# Patient Record
Sex: FEMALE | Race: WHITE | HISPANIC OR LATINO | Employment: UNEMPLOYED | URBAN - METROPOLITAN AREA
[De-identification: names, ages, dates, MRNs, and addresses within clinical notes are randomized per-mention and may not be internally consistent; named-entity substitution may affect disease eponyms.]

---
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
## 2024-01-04 ENCOUNTER — HOSPITAL ENCOUNTER (EMERGENCY)
Facility: HOSPITAL | Age: 19
Discharge: HOME | End: 2024-01-04
Attending: EMERGENCY MEDICINE
Payer: COMMERCIAL

## 2024-01-04 ENCOUNTER — APPOINTMENT (EMERGENCY)
Dept: RADIOLOGY | Facility: HOSPITAL | Age: 19
End: 2024-01-04
Payer: COMMERCIAL

## 2024-01-04 VITALS
OXYGEN SATURATION: 97 % | SYSTOLIC BLOOD PRESSURE: 119 MMHG | TEMPERATURE: 98.1 F | HEART RATE: 76 BPM | DIASTOLIC BLOOD PRESSURE: 65 MMHG | RESPIRATION RATE: 18 BRPM

## 2024-01-04 DIAGNOSIS — R07.9 CHEST PAIN: Primary | ICD-10-CM

## 2024-01-04 LAB
ALBUMIN SERPL BCP-MCNC: 4 G/DL (ref 3.5–5)
ALP SERPL-CCNC: 76 U/L (ref 34–104)
ALT SERPL W P-5'-P-CCNC: 39 U/L (ref 7–52)
ANION GAP SERPL CALCULATED.3IONS-SCNC: 5 MMOL/L
AST SERPL W P-5'-P-CCNC: 36 U/L (ref 13–39)
BASOPHILS # BLD AUTO: 0.01 THOUSANDS/ÂΜL (ref 0–0.1)
BASOPHILS NFR BLD AUTO: 0 % (ref 0–1)
BILIRUB SERPL-MCNC: 0.41 MG/DL (ref 0.2–1)
BUN SERPL-MCNC: 13 MG/DL (ref 5–25)
CALCIUM SERPL-MCNC: 9.3 MG/DL (ref 8.4–10.2)
CARDIAC TROPONIN I PNL SERPL HS: 3 NG/L
CHLORIDE SERPL-SCNC: 108 MMOL/L (ref 96–108)
CO2 SERPL-SCNC: 24 MMOL/L (ref 21–32)
CREAT SERPL-MCNC: 0.86 MG/DL (ref 0.6–1.3)
EOSINOPHIL # BLD AUTO: 0.31 THOUSAND/ÂΜL (ref 0–0.61)
EOSINOPHIL NFR BLD AUTO: 4 % (ref 0–6)
ERYTHROCYTE [DISTWIDTH] IN BLOOD BY AUTOMATED COUNT: 13.2 % (ref 11.6–15.1)
GFR SERPL CREATININE-BSD FRML MDRD: 98 ML/MIN/1.73SQ M
GLUCOSE SERPL-MCNC: 85 MG/DL (ref 65–140)
HCT VFR BLD AUTO: 41.6 % (ref 34.8–46.1)
HGB BLD-MCNC: 13.8 G/DL (ref 11.5–15.4)
IMM GRANULOCYTES # BLD AUTO: 0.02 THOUSAND/UL (ref 0–0.2)
IMM GRANULOCYTES NFR BLD AUTO: 0 % (ref 0–2)
LYMPHOCYTES # BLD AUTO: 2.05 THOUSANDS/ÂΜL (ref 0.6–4.47)
LYMPHOCYTES NFR BLD AUTO: 29 % (ref 14–44)
MCH RBC QN AUTO: 28.6 PG (ref 26.8–34.3)
MCHC RBC AUTO-ENTMCNC: 33.2 G/DL (ref 31.4–37.4)
MCV RBC AUTO: 86 FL (ref 82–98)
MONOCYTES # BLD AUTO: 0.64 THOUSAND/ÂΜL (ref 0.17–1.22)
MONOCYTES NFR BLD AUTO: 9 % (ref 4–12)
NEUTROPHILS # BLD AUTO: 4.17 THOUSANDS/ÂΜL (ref 1.85–7.62)
NEUTS SEG NFR BLD AUTO: 58 % (ref 43–75)
NRBC BLD AUTO-RTO: 0 /100 WBCS
PLATELET # BLD AUTO: 251 THOUSANDS/UL (ref 149–390)
PMV BLD AUTO: 9.5 FL (ref 8.9–12.7)
POTASSIUM SERPL-SCNC: 3.9 MMOL/L (ref 3.5–5.3)
PROT SERPL-MCNC: 7.6 G/DL (ref 6.4–8.4)
RBC # BLD AUTO: 4.82 MILLION/UL (ref 3.81–5.12)
SODIUM SERPL-SCNC: 137 MMOL/L (ref 135–147)
WBC # BLD AUTO: 7.2 THOUSAND/UL (ref 4.31–10.16)

## 2024-01-04 PROCEDURE — 99285 EMERGENCY DEPT VISIT HI MDM: CPT | Performed by: EMERGENCY MEDICINE

## 2024-01-04 PROCEDURE — 80053 COMPREHEN METABOLIC PANEL: CPT | Performed by: EMERGENCY MEDICINE

## 2024-01-04 PROCEDURE — 84484 ASSAY OF TROPONIN QUANT: CPT | Performed by: EMERGENCY MEDICINE

## 2024-01-04 PROCEDURE — 99285 EMERGENCY DEPT VISIT HI MDM: CPT

## 2024-01-04 PROCEDURE — 71045 X-RAY EXAM CHEST 1 VIEW: CPT

## 2024-01-04 PROCEDURE — 36415 COLL VENOUS BLD VENIPUNCTURE: CPT

## 2024-01-04 PROCEDURE — 93005 ELECTROCARDIOGRAM TRACING: CPT

## 2024-01-04 PROCEDURE — 85025 COMPLETE CBC W/AUTO DIFF WBC: CPT | Performed by: EMERGENCY MEDICINE

## 2024-01-04 RX ORDER — NAPROXEN 375 MG/1
375 TABLET ORAL 2 TIMES DAILY WITH MEALS
Qty: 14 TABLET | Refills: 0 | Status: SHIPPED | OUTPATIENT
Start: 2024-01-04 | End: 2024-01-08

## 2024-01-04 NOTE — ED PROVIDER NOTES
"History  Chief Complaint   Patient presents with    Chest Pain     Left sided \"stabbing\" pain in chest intermittently for the past two weeks in various sitting and standing positions, patient reports increasing frequency over the past two days.      19 yo female with two weeks of intermittent pleuritic nonradiating nonexertional chest pain that occurs randomly. Last episode was yesterday. No cough, dyspnea, hemoptysis, leg pain or swelling or h/o vte or recent surgery, trauma or immobilization. No fever or chills. No positional change in pain.         None       Past Medical History:   Diagnosis Date    ADHD (attention deficit hyperactivity disorder)     Self mutilating behavior        History reviewed. No pertinent surgical history.    History reviewed. No pertinent family history.  I have reviewed and agree with the history as documented.    E-Cigarette/Vaping    E-Cigarette Use Never User      E-Cigarette/Vaping Substances     Social History     Tobacco Use    Smoking status: Never    Smokeless tobacco: Never   Vaping Use    Vaping status: Never Used   Substance Use Topics    Alcohol use: Yes     Comment: occasionally    Drug use: Never       Review of Systems   Cardiovascular:  Positive for chest pain.       Physical Exam  Physical Exam  Vitals and nursing note reviewed.   Constitutional:       General: She is not in acute distress.     Appearance: Normal appearance. She is well-developed. She is not ill-appearing, toxic-appearing or diaphoretic.   HENT:      Head: Normocephalic and atraumatic.      Mouth/Throat:      Mouth: Mucous membranes are moist.      Pharynx: Oropharynx is clear.   Eyes:      Conjunctiva/sclera: Conjunctivae normal.      Pupils: Pupils are equal, round, and reactive to light.   Neck:      Vascular: No JVD.   Cardiovascular:      Rate and Rhythm: Normal rate and regular rhythm.      Pulses: Normal pulses.      Heart sounds: Normal heart sounds.   Pulmonary:      Effort: Pulmonary effort is " normal. No respiratory distress.      Breath sounds: Normal breath sounds. No stridor.   Abdominal:      Palpations: Abdomen is soft.   Musculoskeletal:         General: No tenderness or deformity. Normal range of motion.      Cervical back: Normal range of motion and neck supple. No rigidity.   Skin:     General: Skin is warm and dry.      Capillary Refill: Capillary refill takes less than 2 seconds.      Coloration: Skin is not jaundiced or pale.      Findings: No bruising, erythema or rash.   Neurological:      General: No focal deficit present.      Mental Status: She is alert and oriented to person, place, and time.      Cranial Nerves: No cranial nerve deficit.      Sensory: No sensory deficit.      Motor: No weakness or abnormal muscle tone.      Coordination: Coordination normal.      Gait: Gait normal.         Vital Signs  ED Triage Vitals [01/04/24 1637]   Temperature Pulse Respirations Blood Pressure SpO2   98.1 °F (36.7 °C) 76 18 119/65 97 %      Temp Source Heart Rate Source Patient Position - Orthostatic VS BP Location FiO2 (%)   Temporal Monitor Sitting Left arm --      Pain Score       --           Vitals:    01/04/24 1637   BP: 119/65   Pulse: 76   Patient Position - Orthostatic VS: Sitting         Visual Acuity      ED Medications  Medications - No data to display    Diagnostic Studies  Results Reviewed       Procedure Component Value Units Date/Time    HS Troponin 0hr (reflex protocol) [[id number]]  (Normal) Collected: 01/04/24 1640    Lab Status: Final result Specimen: Blood from Arm, Right Updated: 01/04/24 1712     hs TnI 0hr 3 ng/L     Comprehensive metabolic panel [[id number]] Collected: 01/04/24 1640    Lab Status: Final result Specimen: Blood from Arm, Right Updated: 01/04/24 1705     Sodium 137 mmol/L      Potassium 3.9 mmol/L      Chloride 108 mmol/L      CO2 24 mmol/L      ANION GAP 5 mmol/L      BUN 13 mg/dL      Creatinine 0.86 mg/dL      Glucose 85 mg/dL      Calcium 9.3 mg/dL      AST  36 U/L      ALT 39 U/L      Alkaline Phosphatase 76 U/L      Total Protein 7.6 g/dL      Albumin 4.0 g/dL      Total Bilirubin 0.41 mg/dL      eGFR 98 ml/min/1.73sq m     Narrative:      National Kid[employer] Foundation guidelines for Chronic Kidney Disease (CKD):     Stage 1 with normal or high GFR (GFR > 90 mL/min/1.73 square meters)    Stage 2 Mild CKD (GFR = 60-89 mL/min/1.73 square meters)    Stage 3A Moderate CKD (GFR = 45-59 mL/min/1.73 square meters)    Stage 3B Moderate CKD (GFR = 30-44 mL/min/1.73 square meters)    Stage 4 Severe CKD (GFR = 15-29 mL/min/1.73 square meters)    Stage 5 End Stage CKD (GFR <15 mL/min/1.73 square meters)  Note: GFR calculation is accurate only with a steady state creatinine    CBC and differential [[id number]] Collected: 01/04/24 1640    Lab Status: Final result Specimen: Blood from Arm, Right Updated: 01/04/24 1647     WBC 7.20 Thousand/uL      RBC 4.82 Million/uL      Hemoglobin 13.8 g/dL      Hematocrit 41.6 %      MCV 86 fL      MCH 28.6 pg      MCHC 33.2 g/dL      RDW 13.2 %      MPV 9.5 fL      Platelets 251 Thousands/uL      nRBC 0 /100 WBCs      Neutrophils Relative 58 %      Immat GRANS % 0 %      Lymphocytes Relative 29 %      Monocytes Relative 9 %      Eosinophils Relative 4 %      Basophils Relative 0 %      Neutrophils Absolute 4.17 Thousands/µL      Immature Grans Absolute 0.02 Thousand/uL      Lymphocytes Absolute 2.05 Thousands/µL      Monocytes Absolute 0.64 Thousand/µL      Eosinophils Absolute 0.31 Thousand/µL      Basophils Absolute 0.01 Thousands/µL                    XR chest 1 view portable    (Results Pending)              Procedures  ECG 12 Lead Documentation Only    Date/Time: 1/4/2024 4:59 PM    Performed by: Delmer Carrera MD  Authorized by: Delmer Carrera MD    Indications / Diagnosis:  Cp  ECG reviewed by me, the ED Provider: yes    Patient location:  ED  Previous ECG:     Previous ECG:  Unavailable  Interpretation:     Interpretation: normal     Rate:     ECG rate:  71    ECG rate assessment: normal    Rhythm:     Rhythm: sinus rhythm             ED Course             HEART Risk Score      Flowsheet Row Most Recent Value   Heart Score Risk Calculator    History 0 Filed at: 01/04/2024 1700   ECG 0 Filed at: 01/04/2024 1700   Age 0 Filed at: 01/04/2024 1700   Risk Factors 0 Filed at: 01/04/2024 1700   Troponin 0 Filed at: 01/04/2024 1700   HEART Score 0 Filed at: 01/04/2024 1700                              Wells' Criteria for PE      Flowsheet Row Most Recent Value   Wells' Criteria for PE    Clinical signs and symptoms of DVT 0 Filed at: 01/04/2024 1732   PE is primary diagnosis or equally likely 0 Filed at: 01/04/2024 1732   HR >100 0 Filed at: 01/04/2024 1732   Immobilization at least 3 days or Surgery in the previous 4 weeks 0 Filed at: 01/04/2024 1732   Previous, objectively diagnosed PE or DVT 0 Filed at: 01/04/2024 1732   Hemoptysis 0 Filed at: 01/04/2024 1732   Malignancy with treatment within 6 months or palliative 0 Filed at: 01/04/2024 1732   Wells' Criteria Total 0 Filed at: 01/04/2024 1732                  Medical Decision Making  Problems Addressed:  Chest pain: complicated acute illness or injury that poses a threat to life or bodily functions     Details: Ddx includes pneumothorax, pulmonary embolism, acute coronary syndrome    Amount and/or Complexity of Data Reviewed  Labs: ordered.  Radiology: ordered and independent interpretation performed. Decision-making details documented in ED Course.    Risk  Prescription drug management.             Disposition  Final diagnoses:   Chest pain     Time reflects when diagnosis was documented in both MDM as applicable and the Disposition within this note       Time User Action Codes Description Comment    1/4/2024  5:26 PM Delmer Carrera Add [R07.9] Chest pain           ED Disposition       ED Disposition   Discharge    Condition   Stable    Date/Time   Thu Jan 4, 2024 1726    Comment   Carla Calabrese  discharge to home/self care.                   Follow-up Information       Follow up With Specialties Details Why Contact Info Additional Information    [facility] Emergency Medicine  If symptoms worsen [address] [id number] [phone] [facility], [address], [place], [address]            Discharge Medication List as of 1/4/2024  5:27 PM        START taking these medications    Details   naproxen (NAPROSYN) 375 mg tablet Take 1 tablet (375 mg total) by mouth 2 (two) times a day with meals for 7 doses, Starting Thu 1/4/2024, Until Mon 1/8/2024, Print             No discharge procedures on file.    PDMP Review       None            ED Provider  Electronically Signed by             Delmer Carrera MD  01/04/24 7388

## 2024-01-07 LAB
ATRIAL RATE: 71 BPM
P AXIS: 66 DEGREES
PR INTERVAL: 128 MS
QRS AXIS: 83 DEGREES
QRSD INTERVAL: 74 MS
QT INTERVAL: 342 MS
QTC INTERVAL: 371 MS
T WAVE AXIS: 35 DEGREES
VENTRICULAR RATE: 71 BPM

## 2024-11-28 ENCOUNTER — APPOINTMENT (EMERGENCY)
Dept: CT IMAGING | Facility: HOSPITAL | Age: 19
End: 2024-11-28
Payer: OTHER MISCELLANEOUS

## 2024-11-28 ENCOUNTER — HOSPITAL ENCOUNTER (EMERGENCY)
Facility: HOSPITAL | Age: 19
Discharge: HOME | End: 2024-11-28
Attending: EMERGENCY MEDICINE
Payer: OTHER MISCELLANEOUS

## 2024-11-28 VITALS
DIASTOLIC BLOOD PRESSURE: 68 MMHG | HEIGHT: 60 IN | TEMPERATURE: 98 F | HEART RATE: 58 BPM | SYSTOLIC BLOOD PRESSURE: 118 MMHG | RESPIRATION RATE: 16 BRPM | BODY MASS INDEX: 25.52 KG/M2 | OXYGEN SATURATION: 95 % | WEIGHT: 130 LBS

## 2024-11-28 DIAGNOSIS — S09.90XA INJURY OF HEAD, INITIAL ENCOUNTER: Primary | ICD-10-CM

## 2024-11-28 DIAGNOSIS — S06.0X0A CONCUSSION WITHOUT LOSS OF CONSCIOUSNESS, INITIAL ENCOUNTER: ICD-10-CM

## 2024-11-28 LAB
EXT PREGNANCY TEST URINE: NEGATIVE
EXT. CONTROL: NORMAL

## 2024-11-28 PROCEDURE — 99284 EMERGENCY DEPT VISIT MOD MDM: CPT

## 2024-11-28 PROCEDURE — 81025 URINE PREGNANCY TEST: CPT

## 2024-11-28 PROCEDURE — 70450 CT HEAD/BRAIN W/O DYE: CPT

## 2024-11-28 RX ORDER — ONDANSETRON 4 MG/1
4 TABLET, ORALLY DISINTEGRATING ORAL ONCE
Status: COMPLETED | OUTPATIENT
Start: 2024-11-28 | End: 2024-11-28

## 2024-11-28 RX ORDER — ACETAMINOPHEN 325 MG/1
650 TABLET ORAL ONCE
Status: COMPLETED | OUTPATIENT
Start: 2024-11-28 | End: 2024-11-28

## 2024-11-28 RX ORDER — ONDANSETRON 4 MG/1
4 TABLET, ORALLY DISINTEGRATING ORAL EVERY 8 HOURS PRN
Qty: 12 TABLET | Refills: 0 | Status: SHIPPED | OUTPATIENT
Start: 2024-11-28 | End: 2024-12-02

## 2024-11-28 RX ORDER — ONDANSETRON 2 MG/ML
4 INJECTION INTRAMUSCULAR; INTRAVENOUS ONCE
Status: DISCONTINUED | OUTPATIENT
Start: 2024-11-28 | End: 2024-11-28

## 2024-11-28 RX ADMIN — ACETAMINOPHEN 650 MG: 325 TABLET ORAL at 01:24

## 2024-11-28 RX ADMIN — ONDANSETRON 4 MG: 4 TABLET, ORALLY DISINTEGRATING ORAL at 01:30

## 2024-11-28 NOTE — Clinical Note
Carla Calabrese was seen and treated in our emergency department on 11/28/2024.                Diagnosis:     Carla  may return to work on return date, is off the rest of the shift today.    She may return on this date: 11/30/2024         If you have any questions or concerns, please don't hesitate to call.      Peg Sidhu PA-C    ______________________________           _______________          _______________  Hospital Representative                              Date                                Time

## 2024-11-28 NOTE — ED NOTES
Provider at bedside to update and see pt. D/C summary expressed and provided via ER Provider.     Gregory Palacio RN  11/28/24 0152

## 2024-11-28 NOTE — ED PROVIDER NOTES
Time reflects when diagnosis was documented in both MDM as applicable and the Disposition within this note       Time User Action Codes Description Comment    11/28/2024  4:11 AM Bondurant, Peg Childs [S09.90XA] Injury of head, initial encounter     11/28/2024  4:11 AM Peg Sidhu Add [S06.0X0A] Concussion without loss of consciousness, initial encounter           ED Disposition       ED Disposition   Discharge    Condition   Stable    Date/Time   Thu Nov 28, 2024  4:11 AM    Comment   Carla Calabrese discharge to home/self care.                   Assessment & Plan       Medical Decision Making  Vital signs stable and patient well-appearing throughout ER course.  Normal neurological exam.  No focal deficit throughout ER course.  Unremarkable physical exam.  Unremarkable CT imaging.  Improvement of symptoms on reevaluation after medications given in the ER.  Provided patient education and discussed return precautions.  Patient to follow-up with the concussion program, referral placed.  Patient to follow-up with her PCP and return to the ER for any worsening symptoms.  Patient verbalizes understanding and agrees to discharge plan.  Patient was also provided with written discharge instructions.    Amount and/or Complexity of Data Reviewed  Labs: ordered.  Radiology: ordered. Decision-making details documented in ED Course.    Risk  OTC drugs.  Prescription drug management.        ED Course as of 11/28/24 0424   Thu Nov 28, 2024   0411 CT head without contrast  IMPRESSION:     No acute intracranial abnormality.            Medications   sodium chloride 0.9 % bolus 1,000 mL (1,000 mL Intravenous Not Given 11/28/24 0129)   acetaminophen (TYLENOL) tablet 650 mg (650 mg Oral Given 11/28/24 0124)   ondansetron (ZOFRAN-ODT) dispersible tablet 4 mg (4 mg Oral Given 11/28/24 0130)       ED Risk Strat Scores                                               History of Present Illness       Chief Complaint   Patient presents with     Head Injury     Pt walked into a metal sign, felt nauseas afterwards, was sent here for an incident report by her place of employment        Past Medical History:   Diagnosis Date    ADHD (attention deficit hyperactivity disorder)     Self mutilating behavior       No past surgical history on file.   No family history on file.   Social History     Tobacco Use    Smoking status: Never    Smokeless tobacco: Never   Vaping Use    Vaping status: Never Used   Substance Use Topics    Alcohol use: Yes     Comment: occasionally    Drug use: Never      E-Cigarette/Vaping    E-Cigarette Use Never User       E-Cigarette/Vaping Substances      I have reviewed and agree with the history as documented.     Patient is a 19-year-old female who presents to the emergency room s/p head injury.  Patient reports she hit the right side of her head on a metal sign around 11 PM.  Denies loss of consciousness or blood thinner use.  Denies any other trauma or injury.  On initial evaluation, reports lightheadedness and nausea.  Denies any other symptoms.  No medications for symptoms.          Review of Systems   Constitutional:  Negative for chills and fever.   HENT:  Negative for ear pain, sore throat, trouble swallowing and voice change.    Eyes:  Negative for pain and visual disturbance.   Respiratory:  Negative for cough and shortness of breath.    Cardiovascular:  Negative for chest pain and palpitations.   Gastrointestinal:  Positive for nausea. Negative for abdominal pain and vomiting.   Genitourinary:  Negative for dysuria, flank pain and hematuria.   Musculoskeletal:  Negative for arthralgias and back pain.   Skin:  Negative for color change and rash.   Neurological:  Positive for light-headedness and headaches. Negative for seizures and syncope.   Psychiatric/Behavioral:  Negative for confusion.    All other systems reviewed and are negative.          Objective       ED Triage Vitals   Temperature Pulse Blood Pressure Respirations  SpO2 Patient Position - Orthostatic VS   11/28/24 0033 11/28/24 0033 11/28/24 0033 11/28/24 0033 11/28/24 0033 11/28/24 0120   98 °F (36.7 °C) 63 117/80 18 99 % Sitting      Temp src Heart Rate Source BP Location FiO2 (%) Pain Score    -- 11/28/24 0120 11/28/24 0130 -- 11/28/24 0124     Monitor Right arm  6      Vitals      Date and Time Temp Pulse SpO2 Resp BP Pain Score FACES Pain Rating User   11/28/24 0315 -- 58 95 % 16 118/68 -- --    11/28/24 0130 -- 60 96 % 16 116/76 -- --    11/28/24 0124 -- -- -- -- -- 6 --    11/28/24 0033 98 °F (36.7 °C) 63 99 % 18 117/80 -- -- AR            Physical Exam  Vitals and nursing note reviewed.   Constitutional:       General: She is not in acute distress.     Appearance: Normal appearance. She is well-developed.   HENT:      Head: Normocephalic and atraumatic.   Eyes:      Extraocular Movements: Extraocular movements intact.      Conjunctiva/sclera: Conjunctivae normal.      Pupils: Pupils are equal, round, and reactive to light.   Cardiovascular:      Rate and Rhythm: Normal rate and regular rhythm.      Pulses: Normal pulses.      Heart sounds: No murmur heard.  Pulmonary:      Effort: Pulmonary effort is normal. No respiratory distress.      Breath sounds: Normal breath sounds.   Abdominal:      Palpations: Abdomen is soft.      Tenderness: There is no abdominal tenderness. There is no right CVA tenderness or left CVA tenderness.   Musculoskeletal:         General: No swelling.      Cervical back: Neck supple.   Skin:     General: Skin is warm and dry.      Capillary Refill: Capillary refill takes less than 2 seconds.   Neurological:      General: No focal deficit present.      Mental Status: She is alert and oriented to person, place, and time.   Psychiatric:         Mood and Affect: Mood normal.         Results Reviewed       Procedure Component Value Units Date/Time    POCT pregnancy, urine [[id number]]  (Normal) Collected: 11/28/24 0131    Lab Status: Final result  Updated: 11/28/24 0131     EXT Preg Test, Ur Negative     Control Valid            CT head without contrast   Final Interpretation by Lachelle Mace MD (11/28 0405)      No acute intracranial abnormality.                  Workstation performed: [id number]             Procedures    ED Medication and Procedure Management   Prior to Admission Medications   Prescriptions Last Dose Informant Patient Reported? Taking?   naproxen (NAPROSYN) 375 mg tablet   No No   Sig: Take 1 tablet (375 mg total) by mouth 2 (two) times a day with meals for 7 doses      Facility-Administered Medications: None     Patient's Medications   Discharge Prescriptions    ONDANSETRON (ZOFRAN-ODT) 4 MG DISINTEGRATING TABLET    Take 1 tablet (4 mg total) by mouth every 8 (eight) hours as needed for nausea or vomiting for up to 4 days       Start Date: 11/28/2024End Date: 12/2/2024       Order Dose: 4 mg       Quantity: 12 tablet    Refills: 0       ED SEPSIS DOCUMENTATION   Time reflects when diagnosis was documented in both MDM as applicable and the Disposition within this note       Time User Action Codes Description Comment    11/28/2024  4:11 AM Peg Sidhu [S09.90XA] Injury of head, initial encounter     11/28/2024  4:11 AM Peg Sidhu [S06.0X0A] Concussion without loss of consciousness, initial encounter                  Peg Sidhu PA-C  11/28/24 0424

## 2024-11-28 NOTE — DISCHARGE INSTRUCTIONS
"CT, \"IMPRESSION:     No acute intracranial abnormality\".       Zofran as needed for nausea.  Tylenol and Motrin.    Follow-up with the concussion program, your PCP and return to the ER for any worsening symptoms.  "